# Patient Record
Sex: MALE | Race: WHITE
[De-identification: names, ages, dates, MRNs, and addresses within clinical notes are randomized per-mention and may not be internally consistent; named-entity substitution may affect disease eponyms.]

---

## 2021-04-06 PROBLEM — Z00.00 ENCOUNTER FOR PREVENTIVE HEALTH EXAMINATION: Status: ACTIVE | Noted: 2021-04-06

## 2021-04-07 PROBLEM — Z00.00 ENCOUNTER FOR PREVENTIVE HEALTH EXAMINATION: Noted: 2021-04-07

## 2021-05-03 ENCOUNTER — TRANSCRIPTION ENCOUNTER (OUTPATIENT)
Age: 66
End: 2021-05-03

## 2021-05-03 ENCOUNTER — NON-APPOINTMENT (OUTPATIENT)
Age: 66
End: 2021-05-03

## 2021-05-03 ENCOUNTER — APPOINTMENT (OUTPATIENT)
Dept: HEART AND VASCULAR | Facility: CLINIC | Age: 66
End: 2021-05-03
Payer: MEDICARE

## 2021-05-03 VITALS
DIASTOLIC BLOOD PRESSURE: 80 MMHG | TEMPERATURE: 97.9 F | BODY MASS INDEX: 24.98 KG/M2 | HEIGHT: 73 IN | HEART RATE: 65 BPM | OXYGEN SATURATION: 97 % | WEIGHT: 188.5 LBS | SYSTOLIC BLOOD PRESSURE: 150 MMHG

## 2021-05-03 DIAGNOSIS — Z78.9 OTHER SPECIFIED HEALTH STATUS: ICD-10-CM

## 2021-05-03 DIAGNOSIS — Z83.3 FAMILY HISTORY OF DIABETES MELLITUS: ICD-10-CM

## 2021-05-03 DIAGNOSIS — E78.00 PURE HYPERCHOLESTEROLEMIA, UNSPECIFIED: ICD-10-CM

## 2021-05-03 DIAGNOSIS — Z72.89 OTHER PROBLEMS RELATED TO LIFESTYLE: ICD-10-CM

## 2021-05-03 DIAGNOSIS — I25.10 ATHEROSCLEROTIC HEART DISEASE OF NATIVE CORONARY ARTERY W/OUT ANGINA PECTORIS: ICD-10-CM

## 2021-05-03 PROCEDURE — 99203 OFFICE O/P NEW LOW 30 MIN: CPT

## 2021-05-03 PROCEDURE — 93000 ELECTROCARDIOGRAM COMPLETE: CPT

## 2021-05-03 RX ORDER — TAMSULOSIN HYDROCHLORIDE 0.4 MG/1
0.4 CAPSULE ORAL
Refills: 0 | Status: ACTIVE | COMMUNITY

## 2021-05-03 RX ORDER — SILDENAFIL 50 MG/1
50 TABLET ORAL
Refills: 0 | Status: ACTIVE | COMMUNITY

## 2021-05-03 RX ORDER — EZETIMIBE 10 MG/1
10 TABLET ORAL
Refills: 0 | Status: ACTIVE | COMMUNITY

## 2021-05-03 RX ORDER — ROSUVASTATIN CALCIUM 40 MG/1
40 TABLET, FILM COATED ORAL
Refills: 0 | Status: ACTIVE | COMMUNITY

## 2021-05-03 RX ORDER — ASPIRIN ENTERIC COATED TABLETS 81 MG 81 MG/1
81 TABLET, DELAYED RELEASE ORAL
Refills: 0 | Status: ACTIVE | COMMUNITY

## 2021-05-03 RX ORDER — ESCITALOPRAM OXALATE 10 MG/1
10 TABLET ORAL
Refills: 0 | Status: ACTIVE | COMMUNITY

## 2021-05-03 NOTE — REASON FOR VISIT
[FreeTextEntry1] : 64 y/o M with an elevated CCS.  Was seeing Dr Hung Hsu.  CCS was 449 in 2014.  \par Tested + for Covid May 2020, infection was asymptomatic. \par \par EKG: NSR, normal axis and intervals, RSR' in V1, no ST-Tw abnormalities. 5/3/21

## 2021-05-03 NOTE — ASSESSMENT
[FreeTextEntry1] : ASHD-   CCS of 449 in 2014.  Needs intermittent stress testing.  Will proceed with a stress echo.  Has good risk factor control, currently on ASA, Crestor and Zetia.

## 2021-05-20 ENCOUNTER — APPOINTMENT (OUTPATIENT)
Dept: HEART AND VASCULAR | Facility: CLINIC | Age: 66
End: 2021-05-20

## 2021-06-01 ENCOUNTER — APPOINTMENT (OUTPATIENT)
Dept: HEART AND VASCULAR | Facility: CLINIC | Age: 66
End: 2021-06-01
Payer: MEDICARE

## 2021-06-01 PROCEDURE — 93325 DOPPLER ECHO COLOR FLOW MAPG: CPT | Mod: 59

## 2021-06-01 PROCEDURE — 93320 DOPPLER ECHO COMPLETE: CPT

## 2021-06-01 PROCEDURE — 93351 STRESS TTE COMPLETE: CPT

## 2022-03-28 ENCOUNTER — RESULT REVIEW (OUTPATIENT)
Age: 67
End: 2022-03-28

## 2022-03-28 ENCOUNTER — APPOINTMENT (OUTPATIENT)
Dept: ORTHOPEDIC SURGERY | Facility: CLINIC | Age: 67
End: 2022-03-28
Payer: MEDICARE

## 2022-03-28 ENCOUNTER — OUTPATIENT (OUTPATIENT)
Dept: OUTPATIENT SERVICES | Facility: HOSPITAL | Age: 67
LOS: 1 days | End: 2022-03-28
Payer: MEDICARE

## 2022-03-28 VITALS
DIASTOLIC BLOOD PRESSURE: 80 MMHG | TEMPERATURE: 98.2 F | SYSTOLIC BLOOD PRESSURE: 150 MMHG | OXYGEN SATURATION: 97 % | WEIGHT: 188 LBS | HEART RATE: 62 BPM | BODY MASS INDEX: 24.92 KG/M2 | HEIGHT: 73 IN

## 2022-03-28 PROCEDURE — 73564 X-RAY EXAM KNEE 4 OR MORE: CPT

## 2022-03-28 PROCEDURE — 73564 X-RAY EXAM KNEE 4 OR MORE: CPT | Mod: 26,50

## 2022-03-28 PROCEDURE — 72170 X-RAY EXAM OF PELVIS: CPT

## 2022-03-28 PROCEDURE — 72170 X-RAY EXAM OF PELVIS: CPT | Mod: 26

## 2022-03-28 PROCEDURE — 99204 OFFICE O/P NEW MOD 45 MIN: CPT

## 2022-03-28 NOTE — HISTORY OF PRESENT ILLNESS
[de-identified] : 3/28/22: Mr. MERLE DE LA ROSA is a very pleasant 66 year male who comes in for evaluation of left knee pain. He had left total knee arthroplasty in 2019 by Dr Godfrey Campo at [facility].\par The pain is located primarily in the left knee constant in nature and described as dull and achy and sometimes sharp pain. The pain is rated as 4/10 during today's visit, and ranges from 4-7/10. The patient's symptoms are aggravated by walking on incline, running and the use of stairs  and alleviated by nothing.  The patient has no other complaints at this time.\par \par He reports a long history of left knee pain stemming from a meniscal injury in 1971. He had two knee arthroscopies in the early 2000s followed by the TKA as above. Ever since TKA, he reports focal pain over Gerdy's tubercle without associated effusions or mechanical symptoms. He has no problem walking on level ground and typically walks 3-4 miles daily. However, he has some difficulty initiating motion and especially with stairs. He reports that he underwent an infectious workup at [facility] which was negative at about 6mo postoperatively. Multiple rounds of PT have been unsuccessful. He had an ITB CSI by Dr. Campo which completely alleviated symptoms for one day. Subsequent ITB injections were completely ineffective. He has used ibuprofen off and on but feels it isn't effective. No further surgery was recommended by Dr. Campo.

## 2022-03-28 NOTE — PHYSICAL EXAM
[de-identified] : General appearance: well nourished and hydrated, pleasant, alert and oriented x 3, cooperative.  \par HEENT: normocephalic, EOM intact, wearing mask, external auditory canal clear.  \par Cardiovascular: no lower leg edema, no varicosities, dorsalis pedis pulses palpable and symmetric.  \par Lymphatics: no palpable lymphadenopathy, no lymphedema.  \par Neurologic: sensation is normal, no muscle weakness in upper or lower extremities, patella tendon reflexes present and symmetric.  \par Dermatologic: skin moist, warm, no rash.  \par Spine: cervical spine with normal lordosis and painless range of motion, thoracic spine with normal kyphosis and painless range of motion, lumbosacral spine with normal lordosis and painless range of motion. \par Gait: normal.  \par \par Left knee: all fields negative/normal/unremarkable unless otherwise noted -- \par - Focal soft tissue swelling: \par - Ecchymosis: \par - Erythema: \par - Effusion: small\par - Wounds: healed short midline incision, arthroscopic portals\par - Alignment: normal\par - Tenderness: focal over Gerdy's tubercle\par - ROM: 0-130\par - Collateral laxity: \par - Cruciate laxity: \par - Popliteal angle (degrees): 40\par - Quad strength:  [de-identified] : AP pelvis and 4 views of the bilateral knees (weightbearing AP, weightbearing Stewart, weightbearing lateral, and Sunrise) were interpreted by me and reviewed with the patient.\par \par Location of imaging: [facility]\par Date of exam: 3/28/22\par \par Pelvic alignment: normal\par \par Right hip -- \par Arthritis: none\par Deformity: none\par Osteonecrosis: none\par \par Left hip -- \par Arthritis: none\par Deformity: none\par Osteonecrosis: none\par \par Right knee -- \par Alignment: normal\par Arthritis: minimal, KL0-1\par Patellar height: normal\par Patellar tracking: central\par \par Left knee -- TKA in position. All components appear to be well fixed in reasonable alignment without evidence of mechanical complication. No lateral component overhang is noted, nor residual lateral osteophytes or extruded cement. Patella sits at appropriate height and tracks centrally with mild lateral tilt. \par \par MRI of the left knee was also reviewed, read at hospitals on 9/15/20. Relevant findings: no component loosening; small joint effusion; insertional patellar tendinosis; anterior and posterior capsular scar formation; some insertional patellar tendinosis. I do not appreciate any anterolateral overhang of either component on the axials.

## 2022-03-28 NOTE — DISCUSSION/SUMMARY
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 I do not appreciate any associated flexion instability clinically. I recommended against any revision arthroplasty. I think that the next most reasonable step is to try a geniculate nerve ablation, for which I will refer him to Pain Management\par - RTC in 2mo. If ablations are unsuccessful, I think it may be reasonable to consider further surgical management. I would not recommend a formal revision but could consider an exploration for removal of any impinging structures and/or ITB release

## 2022-04-18 ENCOUNTER — TRANSCRIPTION ENCOUNTER (OUTPATIENT)
Age: 67
End: 2022-04-18

## 2022-04-19 ENCOUNTER — TRANSCRIPTION ENCOUNTER (OUTPATIENT)
Age: 67
End: 2022-04-19

## 2022-05-13 ENCOUNTER — APPOINTMENT (OUTPATIENT)
Dept: ORTHOPEDIC SURGERY | Facility: CLINIC | Age: 67
End: 2022-05-13
Payer: MEDICARE

## 2022-05-13 ENCOUNTER — LABORATORY RESULT (OUTPATIENT)
Age: 67
End: 2022-05-13

## 2022-05-13 VITALS
DIASTOLIC BLOOD PRESSURE: 84 MMHG | SYSTOLIC BLOOD PRESSURE: 124 MMHG | WEIGHT: 195 LBS | HEIGHT: 73 IN | BODY MASS INDEX: 25.84 KG/M2

## 2022-05-13 DIAGNOSIS — Z96.652 PAIN IN LEFT KNEE: ICD-10-CM

## 2022-05-13 DIAGNOSIS — M25.562 PAIN IN LEFT KNEE: ICD-10-CM

## 2022-05-13 DIAGNOSIS — G89.29 PAIN IN LEFT KNEE: ICD-10-CM

## 2022-05-13 PROCEDURE — 20610 DRAIN/INJ JOINT/BURSA W/O US: CPT

## 2022-05-13 PROCEDURE — 99214 OFFICE O/P EST MOD 30 MIN: CPT | Mod: 25

## 2022-05-15 NOTE — DISCUSSION/SUMMARY
[de-identified] : 65y/o male with painful left total knee replacement\par - He is indicated for operative debridement of the left iliotibial band and anterolateral tibial osteophyte. We discussed that while he does have a touch of internal rotation in both components, I do not feel that the degree of malrotation merits a full component revision.\par - We discussed the details of the procedure, the expected recovery period, and the expected outcome. We discussed the likelihood of satisfaction after complete recovery, and the potential causes of dissatisfaction. The importance of active patient participation in the rehabilitation protocol was emphasized, along with its influence on short and long-term outcomes. Specific risks were discussed in detail. We discussed the risk of surgical site complications including but not limited to: surgical site infection, wound healing complications, bone fracture, tendon or ligament injury, neurovascular injury, hemorrhage, postoperative stiffness or instability, persistent pain and need for reoperation or manipulation under anesthesia. We discussed surgical blood loss and the possible need for blood transfusion. We discussed the risk of perioperative medical complications, including but not limited to catheter-associated urinary tract infection, venous thromboembolism and other cardiopulmonary complications. We discussed anesthetic options and the risk of anesthesia-related complications. All questions were answered the patient's satisfaction. I asked the patient to either call back or schedule a followup appointment for any additional questions or concerns regarding the procedure.\par - Aspiration of the left knee was performed today, and the fluid was sent for the usual panel\par - Book for a convenient time, as an outpatient procedure. Standard medical clearance preoperatively\par - PT referral to begin sessions immediately postoperative

## 2022-05-15 NOTE — PROCEDURE
[de-identified] : Procedure: Knee joint aspiration\par Laterality: left\par Indication: diagnostic - discussed with patient\par Skin prep: alcohol and chlorhexidine\par Anesthesia: ethyl chloride spray\par Needle: 18-gauge\par Portal: superolateral\par Aspiration: 15cc normal appearing synovial fluid\par Injectate: none\par Dressing: Band-aid\par Complications: None\par

## 2022-05-15 NOTE — PHYSICAL EXAM
[de-identified] : General appearance: well nourished and hydrated, pleasant, alert and oriented x 3, cooperative.  \par HEENT: normocephalic, EOM intact, wearing mask, external auditory canal clear.  \par Cardiovascular: no lower leg edema, no varicosities, dorsalis pedis pulses palpable and symmetric.  \par Lymphatics: no palpable lymphadenopathy, no lymphedema.  \par Neurologic: sensation is normal, no muscle weakness in upper or lower extremities, patella tendon reflexes present and symmetric.  \par Dermatologic: skin moist, warm, no rash.  \par Spine: cervical spine with normal lordosis and painless range of motion, thoracic spine with normal kyphosis and painless range of motion, lumbosacral spine with normal lordosis and painless range of motion. \par Gait: normal.  \par \par Left knee: all fields negative/normal/unremarkable unless otherwise noted -- \par - Focal soft tissue swelling: \par - Ecchymosis: \par - Erythema: \par - Effusion: small\par - Wounds: healed short midline incision, arthroscopic portals\par - Alignment: normal\par - Tenderness: focal over Gerdy's tubercle\par - ROM: 0-130\par - Collateral laxity: \par - Cruciate laxity: \par - Popliteal angle (degrees): 40\par - Quad strength:  [de-identified] : We reviewed the left knee CT dated 11/12/21 from an outside facility, now uploaded to [facility]. The components appear to be well fixed without evidence of mechanical complication. The tibial and femoral components are both mildly internally rotated, each less than 3 degrees. I can appreciate an anterolateral tibial osteophyte which is relatively proud to the tibial component at the level of the joint line.

## 2022-05-15 NOTE — HISTORY OF PRESENT ILLNESS
[de-identified] : 5/13/22: Returning to review his prior CT of the left knee. Symptoms unchanged from prior (geniculate blocks were completely ineffective).\par \par 3/28/22: Mr. MERLE DE LA ROSA is a very pleasant 66 year male who comes in for evaluation of left knee pain. He had left total knee arthroplasty in 2019 by Dr Godfrey Campo at [facility].\par The pain is located primarily in the left knee constant in nature and described as dull and achy and sometimes sharp pain. The pain is rated as 4/10 during today's visit, and ranges from 4-7/10. The patient's symptoms are aggravated by walking on incline, running and the use of stairs  and alleviated by nothing.  The patient has no other complaints at this time.\par \par He reports a long history of left knee pain stemming from a meniscal injury in 1971. He had two knee arthroscopies in the early 2000s followed by the TKA as above. Ever since TKA, he reports focal pain over Gerdy's tubercle without associated effusions or mechanical symptoms. He has no problem walking on level ground and typically walks 3-4 miles daily. However, he has some difficulty initiating motion and especially with stairs. He reports that he underwent an infectious workup at [facility] which was negative at about 6mo postoperatively. Multiple rounds of PT have been unsuccessful. He had an ITB CSI by Dr. Campo which completely alleviated symptoms for one day. Subsequent ITB injections were completely ineffective. He has used ibuprofen off and on but feels it isn't effective. No further surgery was recommended by Dr. Campo.

## 2022-05-18 ENCOUNTER — TRANSCRIPTION ENCOUNTER (OUTPATIENT)
Age: 67
End: 2022-05-18

## 2022-05-18 LAB
B PERT IGG+IGM PNL SER: ABNORMAL
COLOR FLD: NORMAL
EOSINOPHIL # FLD MANUAL: 0 %
FLUID INTAKE SUBSTANCE CLASS: NORMAL
LYMPHOCYTES # FLD MANUAL: 30 %
MESOTHL CELL NFR FLD: 0 %
MONOS+MACROS NFR FLD MANUAL: 67 %
NEUTS SEG # FLD MANUAL: 3 %
NRBC # FLD: 0 %
RBC # FLD MANUAL: ABNORMAL /UL
SYCRY CLARITY: ABNORMAL
SYCRY COLOR: ABNORMAL
SYCRY ID: NORMAL
SYCRY TUBE: NORMAL
TOTAL CELLS COUNTED FLD: 830 /UL
TUBE TYPE: NORMAL
UNIDENT CELLS NFR FLD MANUAL: 0 %
VARIANT LYMPHS # FLD MANUAL: 0 %

## 2022-05-20 ENCOUNTER — NON-APPOINTMENT (OUTPATIENT)
Age: 67
End: 2022-05-20

## 2022-05-24 ENCOUNTER — APPOINTMENT (OUTPATIENT)
Dept: ORTHOPEDIC SURGERY | Facility: CLINIC | Age: 67
End: 2022-05-24
Payer: MEDICARE

## 2022-05-24 VITALS — DIASTOLIC BLOOD PRESSURE: 77 MMHG | HEART RATE: 70 BPM | SYSTOLIC BLOOD PRESSURE: 127 MMHG

## 2022-05-24 DIAGNOSIS — Z96.652 PAIN DUE TO INTERNAL ORTHOPEDIC PROSTHETIC DEVICES, IMPLANTS AND GRAFTS, INITIAL ENCOUNTER: ICD-10-CM

## 2022-05-24 DIAGNOSIS — T84.84XA PAIN DUE TO INTERNAL ORTHOPEDIC PROSTHETIC DEVICES, IMPLANTS AND GRAFTS, INITIAL ENCOUNTER: ICD-10-CM

## 2022-05-24 PROCEDURE — 20610 DRAIN/INJ JOINT/BURSA W/O US: CPT

## 2022-05-30 NOTE — PROCEDURE
[de-identified] : Brought patient back for repeat aspiration given delayed appearance of Staph epi on his aspiration the other week.\par \par Procedure: Knee joint aspiration\par Laterality: left\par Indication: r/o infection - discussed with patient\par Skin prep: alcohol and chlorhexidine\par Anesthesia: ethyl chloride spray\par Needle: 18-gauge\par Portal: superolateral\par Aspiration: 13cc normal appearing synovial fluid\par Injectate: none\par Dressing: Band-aid\par Complications: None\par \par Callback with results and further recommendations.

## 2022-05-30 NOTE — PROCEDURE
[de-identified] : Brought patient back for repeat aspiration given delayed appearance of Staph epi on his aspiration the other week.\par \par Procedure: Knee joint aspiration\par Laterality: left\par Indication: r/o infection - discussed with patient\par Skin prep: alcohol and chlorhexidine\par Anesthesia: ethyl chloride spray\par Needle: 18-gauge\par Portal: superolateral\par Aspiration: 13cc normal appearing synovial fluid\par Injectate: none\par Dressing: Band-aid\par Complications: None\par \par Callback with results and further recommendations.

## 2022-06-02 LAB
B PERT IGG+IGM PNL SER: NORMAL
BACTERIA FLD CULT: NORMAL
COLOR FLD: YELLOW
FLUID INTAKE SUBSTANCE CLASS: NORMAL
LYMPHOCYTES # FLD MANUAL: 43 %
MONOS+MACROS NFR FLD MANUAL: 56 %
NEUTS SEG # FLD MANUAL: 1 %
RBC # FLD MANUAL: 2000 /UL
SYCRY CLARITY: NORMAL
SYCRY COLOR: YELLOW
SYCRY ID: NORMAL
SYCRY TUBE: NORMAL
TOTAL CELLS COUNTED FLD: 677 /UL
TUBE TYPE: NORMAL
URATE AMN-MCNC: NORMAL

## 2022-06-12 LAB — FUNGUS FLD CULT: NORMAL

## 2024-02-20 ENCOUNTER — APPOINTMENT (OUTPATIENT)
Dept: UROLOGY | Facility: CLINIC | Age: 69
End: 2024-02-20
Payer: MEDICARE

## 2024-02-20 VITALS
OXYGEN SATURATION: 95 % | TEMPERATURE: 98.6 F | DIASTOLIC BLOOD PRESSURE: 84 MMHG | HEART RATE: 64 BPM | BODY MASS INDEX: 25.84 KG/M2 | WEIGHT: 195 LBS | HEIGHT: 73 IN | SYSTOLIC BLOOD PRESSURE: 145 MMHG

## 2024-02-20 DIAGNOSIS — N40.1 BENIGN PROSTATIC HYPERPLASIA WITH LOWER URINARY TRACT SYMPMS: ICD-10-CM

## 2024-02-20 PROCEDURE — 51798 US URINE CAPACITY MEASURE: CPT

## 2024-02-20 PROCEDURE — 99204 OFFICE O/P NEW MOD 45 MIN: CPT

## 2024-02-20 NOTE — ASSESSMENT
[FreeTextEntry1] : 68 year old male with BPH and LUTS on tamsulosin 0.8 mg still with bothersome symptoms. Prostate size 74 grams on ultrasound.   I spent this consultation with MERLE DE LA ROSA discussing the causes, sequelae, and management of an enlarged prostate. Medical management with alpha-blockers or tadalafil, which facilitates bladder emptying, is the first-line therapy. Furthermore, 5-alpha reductase inhibitors to shrink the prostate and/or anticholinergics to relax the bladder may also be added. Indications for surgical intervention include urinary retention, urinary tract infection, evidence of uropathy and worsening renal function, bladder stones, and gross hematuria.  We discussed different therapeutic options including TURP, PVP, HoLEP, Aquablation, Urolift therapy, and Rezum in addition to full continuation of medical therapy. Discussed the issues of incontinence, retrograde ejaculation, erectile dysfunction, irritative voiding symptoms, injury to urethra and bladder, persistence of irritative voiding symptoms, urethral stricture or bladder neck contracture, need for open surgery to repair the injury, erectile dysfunction and infertility. - Discussed adding finasteride, which he is not particularly interested in - Discussed performing UDS, however, clear signs of obstruction based on urinary symptoms, so likely not necessary - Continue tamsulosin 0.8 mg  - Will consider his options for surgical intervention, provided with educational materials - RTC 3-6 months or PRN

## 2024-02-20 NOTE — HISTORY OF PRESENT ILLNESS
[FreeTextEntry1] : 68 year old male presents with LUTS x 2 years. States he has taken flomax for the last 2 years, worsening LUTS over 6 months. Increased to flomax BID, with minimal improvement in his symptoms. Urinates with slow flow, feeling of incomplete emptying, urgency, hesitancy, post-void dribbling. Denies dysuria, hematuria.   He had a renal/bladder US 10 days ago which showed a PVR of 10 ml and a prostate size of 74 cc.   No FH of  malignancies.    PSA History:  2.6-- 3/2023  3.1-- 12/202  3.0-- 4/2018  Cr: 1.16 1/2024  T- normal in 2020   PVR: 8 ml

## 2024-02-20 NOTE — LETTER BODY
[Dear  ___] : Dear  [unfilled], [Courtesy Letter:] : I had the pleasure of seeing your patient, [unfilled], in my office today. [Please see my note below.] : Please see my note below. [Consult Closing:] : Thank you very much for allowing me to participate in the care of this patient.  If you have any questions, please do not hesitate to contact me. [Sincerely,] : Sincerely, [FreeTextEntry3] : Chel Huang MD

## 2024-02-26 ENCOUNTER — TRANSCRIPTION ENCOUNTER (OUTPATIENT)
Age: 69
End: 2024-02-26

## 2024-02-26 RX ORDER — FINASTERIDE 5 MG/1
5 TABLET, FILM COATED ORAL DAILY
Qty: 90 | Refills: 3 | Status: ACTIVE | COMMUNITY
Start: 2024-02-26 | End: 1900-01-01

## 2024-02-27 ENCOUNTER — TRANSCRIPTION ENCOUNTER (OUTPATIENT)
Age: 69
End: 2024-02-27

## 2024-03-04 ENCOUNTER — TRANSCRIPTION ENCOUNTER (OUTPATIENT)
Age: 69
End: 2024-03-04

## 2024-03-06 ENCOUNTER — APPOINTMENT (OUTPATIENT)
Dept: UROLOGY | Facility: CLINIC | Age: 69
End: 2024-03-06

## 2024-03-12 ENCOUNTER — APPOINTMENT (OUTPATIENT)
Dept: UROLOGY | Facility: CLINIC | Age: 69
End: 2024-03-12

## 2024-03-12 ENCOUNTER — TRANSCRIPTION ENCOUNTER (OUTPATIENT)
Age: 69
End: 2024-03-12

## 2024-03-13 DIAGNOSIS — R93.1 ABNORMAL FINDINGS ON DIAGNOSTIC IMAGING OF HEART AND CORONARY CIRCULATION: ICD-10-CM

## 2024-03-26 ENCOUNTER — APPOINTMENT (OUTPATIENT)
Dept: HEART AND VASCULAR | Facility: CLINIC | Age: 69
End: 2024-03-26

## 2024-04-08 ENCOUNTER — APPOINTMENT (OUTPATIENT)
Dept: HEART AND VASCULAR | Facility: CLINIC | Age: 69
End: 2024-04-08

## 2024-04-23 ENCOUNTER — APPOINTMENT (OUTPATIENT)
Dept: HEART AND VASCULAR | Facility: CLINIC | Age: 69
End: 2024-04-23
Payer: MEDICARE

## 2024-04-23 PROCEDURE — A9500: CPT

## 2024-04-23 PROCEDURE — 78452 HT MUSCLE IMAGE SPECT MULT: CPT

## 2024-04-23 PROCEDURE — 93015 CV STRESS TEST SUPVJ I&R: CPT

## 2024-12-24 PROBLEM — F10.90 ALCOHOL USE: Status: ACTIVE | Noted: 2021-05-03

## 2025-04-19 ENCOUNTER — TRANSCRIPTION ENCOUNTER (OUTPATIENT)
Age: 70
End: 2025-04-19

## 2025-05-16 ENCOUNTER — APPOINTMENT (OUTPATIENT)
Dept: UROLOGY | Facility: CLINIC | Age: 70
End: 2025-05-16
Payer: MEDICARE

## 2025-05-16 ENCOUNTER — NON-APPOINTMENT (OUTPATIENT)
Age: 70
End: 2025-05-16

## 2025-05-16 VITALS
TEMPERATURE: 97.7 F | BODY MASS INDEX: 25.84 KG/M2 | SYSTOLIC BLOOD PRESSURE: 130 MMHG | HEART RATE: 65 BPM | DIASTOLIC BLOOD PRESSURE: 71 MMHG | HEIGHT: 73 IN | WEIGHT: 195 LBS

## 2025-05-16 DIAGNOSIS — R30.0 DYSURIA: ICD-10-CM

## 2025-05-16 DIAGNOSIS — N40.1 BENIGN PROSTATIC HYPERPLASIA WITH LOWER URINARY TRACT SYMPMS: ICD-10-CM

## 2025-05-16 PROCEDURE — G2211 COMPLEX E/M VISIT ADD ON: CPT

## 2025-05-16 PROCEDURE — 51798 US URINE CAPACITY MEASURE: CPT

## 2025-05-16 PROCEDURE — 99214 OFFICE O/P EST MOD 30 MIN: CPT

## 2025-05-19 LAB
APPEARANCE: CLEAR
BACTERIA UR CULT: NORMAL
BACTERIA: NEGATIVE /HPF
BILIRUBIN URINE: NEGATIVE
BLOOD URINE: NEGATIVE
CAST: 0 /LPF
COLOR: YELLOW
EPITHELIAL CELLS: 2 /HPF
GLUCOSE QUALITATIVE U: NEGATIVE MG/DL
KETONES URINE: NEGATIVE MG/DL
LEUKOCYTE ESTERASE URINE: NEGATIVE
MICROSCOPIC-UA: NORMAL
NITRITE URINE: NEGATIVE
PH URINE: 5.5
PROTEIN URINE: NORMAL MG/DL
RED BLOOD CELLS URINE: 1 /HPF
SPECIFIC GRAVITY URINE: 1.02
UROBILINOGEN URINE: 0.2 MG/DL
WHITE BLOOD CELLS URINE: 2 /HPF

## 2025-05-28 ENCOUNTER — NON-APPOINTMENT (OUTPATIENT)
Age: 70
End: 2025-05-28

## 2025-05-28 ENCOUNTER — APPOINTMENT (OUTPATIENT)
Dept: UROLOGY | Facility: CLINIC | Age: 70
End: 2025-05-28
Payer: MEDICARE

## 2025-05-28 VITALS
BODY MASS INDEX: 25.84 KG/M2 | HEART RATE: 71 BPM | DIASTOLIC BLOOD PRESSURE: 72 MMHG | TEMPERATURE: 97.5 F | SYSTOLIC BLOOD PRESSURE: 150 MMHG | HEIGHT: 73 IN | WEIGHT: 195 LBS

## 2025-05-28 DIAGNOSIS — N40.1 BENIGN PROSTATIC HYPERPLASIA WITH LOWER URINARY TRACT SYMPMS: ICD-10-CM

## 2025-05-28 PROCEDURE — 99213 OFFICE O/P EST LOW 20 MIN: CPT | Mod: 25

## 2025-05-28 PROCEDURE — 52000 CYSTOURETHROSCOPY: CPT

## 2025-07-26 ENCOUNTER — TRANSCRIPTION ENCOUNTER (OUTPATIENT)
Age: 70
End: 2025-07-26

## 2025-07-28 ENCOUNTER — TRANSCRIPTION ENCOUNTER (OUTPATIENT)
Age: 70
End: 2025-07-28

## 2025-07-29 ENCOUNTER — TRANSCRIPTION ENCOUNTER (OUTPATIENT)
Age: 70
End: 2025-07-29

## 2025-08-13 ENCOUNTER — APPOINTMENT (OUTPATIENT)
Dept: UROLOGY | Facility: CLINIC | Age: 70
End: 2025-08-13
Payer: MEDICARE

## 2025-08-13 VITALS
TEMPERATURE: 98.6 F | HEART RATE: 69 BPM | SYSTOLIC BLOOD PRESSURE: 111 MMHG | OXYGEN SATURATION: 97 % | DIASTOLIC BLOOD PRESSURE: 63 MMHG

## 2025-08-13 DIAGNOSIS — N40.1 BENIGN PROSTATIC HYPERPLASIA WITH LOWER URINARY TRACT SYMPMS: ICD-10-CM

## 2025-08-13 PROCEDURE — 99214 OFFICE O/P EST MOD 30 MIN: CPT

## 2025-08-13 PROCEDURE — G2211 COMPLEX E/M VISIT ADD ON: CPT

## 2025-08-13 PROCEDURE — 51798 US URINE CAPACITY MEASURE: CPT

## 2025-08-14 LAB
APPEARANCE: CLEAR
BACTERIA: NEGATIVE /HPF
BILIRUBIN URINE: NEGATIVE
BLOOD URINE: NEGATIVE
CAST: 0 /LPF
COLOR: YELLOW
EPITHELIAL CELLS: 1 /HPF
GLUCOSE QUALITATIVE U: NEGATIVE MG/DL
KETONES URINE: NEGATIVE MG/DL
LEUKOCYTE ESTERASE URINE: ABNORMAL
MICROSCOPIC-UA: NORMAL
NITRITE URINE: NEGATIVE
PH URINE: 6
PROTEIN URINE: NEGATIVE MG/DL
RED BLOOD CELLS URINE: 1 /HPF
SPECIFIC GRAVITY URINE: 1.02
UROBILINOGEN URINE: 0.2 MG/DL
WHITE BLOOD CELLS URINE: 2 /HPF

## 2025-08-15 LAB — BACTERIA UR CULT: NORMAL

## 2025-09-02 ENCOUNTER — TRANSCRIPTION ENCOUNTER (OUTPATIENT)
Age: 70
End: 2025-09-02

## 2025-09-08 ENCOUNTER — TRANSCRIPTION ENCOUNTER (OUTPATIENT)
Age: 70
End: 2025-09-08

## 2025-09-15 ENCOUNTER — TRANSCRIPTION ENCOUNTER (OUTPATIENT)
Age: 70
End: 2025-09-15